# Patient Record
Sex: FEMALE | Race: WHITE
[De-identification: names, ages, dates, MRNs, and addresses within clinical notes are randomized per-mention and may not be internally consistent; named-entity substitution may affect disease eponyms.]

---

## 2020-06-15 ENCOUNTER — HOSPITAL ENCOUNTER (EMERGENCY)
Dept: HOSPITAL 12 - ER | Age: 25
Discharge: HOME | End: 2020-06-15
Payer: MEDICAID

## 2020-06-15 VITALS — SYSTOLIC BLOOD PRESSURE: 110 MMHG | DIASTOLIC BLOOD PRESSURE: 64 MMHG

## 2020-06-15 VITALS — HEIGHT: 67 IN | BODY MASS INDEX: 29.82 KG/M2 | WEIGHT: 190 LBS

## 2020-06-15 DIAGNOSIS — N75.1: Primary | ICD-10-CM

## 2020-06-15 LAB — HCG UR QL: NEGATIVE

## 2020-06-15 PROCEDURE — 84703 CHORIONIC GONADOTROPIN ASSAY: CPT

## 2020-06-15 PROCEDURE — A4663 DIALYSIS BLOOD PRESSURE CUFF: HCPCS

## 2020-06-15 PROCEDURE — 99284 EMERGENCY DEPT VISIT MOD MDM: CPT

## 2020-06-15 PROCEDURE — 56420 I&D BARTHOLINS GLAND ABSCESS: CPT

## 2020-06-15 PROCEDURE — 0U9L0ZZ DRAINAGE OF VESTIBULAR GLAND, OPEN APPROACH: ICD-10-PCS | Performed by: EMERGENCY MEDICINE

## 2020-06-16 ENCOUNTER — HOSPITAL ENCOUNTER (EMERGENCY)
Dept: HOSPITAL 12 - ER | Age: 25
Discharge: HOME | End: 2020-06-16
Payer: COMMERCIAL

## 2020-06-16 VITALS — HEIGHT: 67 IN | WEIGHT: 180 LBS | BODY MASS INDEX: 28.25 KG/M2

## 2020-06-16 DIAGNOSIS — N75.1: Primary | ICD-10-CM

## 2020-06-16 PROCEDURE — 96372 THER/PROPH/DIAG INJ SC/IM: CPT

## 2020-06-16 PROCEDURE — 99283 EMERGENCY DEPT VISIT LOW MDM: CPT

## 2020-06-16 PROCEDURE — 56420 I&D BARTHOLINS GLAND ABSCESS: CPT

## 2020-06-16 PROCEDURE — A4663 DIALYSIS BLOOD PRESSURE CUFF: HCPCS

## 2020-06-16 PROCEDURE — 87070 CULTURE OTHR SPECIMN AEROBIC: CPT

## 2020-06-16 NOTE — NUR
assis[place] md with 1@d of the right labia, pt tolerated well.

-------------------------------------------------------------------------------

Addendum: 06/16/20 at 1236 by ALEXANDRE

-------------------------------------------------------------------------------

keshawn jacobsen was missing.

## 2020-06-18 ENCOUNTER — HOSPITAL ENCOUNTER (EMERGENCY)
Dept: HOSPITAL 12 - ER | Age: 25
Discharge: HOME | End: 2020-06-18
Payer: COMMERCIAL

## 2020-06-18 VITALS — HEIGHT: 67 IN | BODY MASS INDEX: 29.82 KG/M2 | WEIGHT: 190 LBS

## 2020-06-18 DIAGNOSIS — Z48.816: Primary | ICD-10-CM

## 2020-06-18 DIAGNOSIS — N75.0: ICD-10-CM

## 2020-06-18 PROCEDURE — A4663 DIALYSIS BLOOD PRESSURE CUFF: HCPCS

## 2025-03-09 NOTE — NUR
MD REMOVED THE DRESSING AND THE PACKED IODOFORM. PT TOLERATED WELL, NO PUSS/D/C 
NOTICED. PT DENIES ANY PAIN OR DISCOMFORT. 22 24